# Patient Record
Sex: MALE | Race: BLACK OR AFRICAN AMERICAN | NOT HISPANIC OR LATINO | Employment: STUDENT | RURAL
[De-identification: names, ages, dates, MRNs, and addresses within clinical notes are randomized per-mention and may not be internally consistent; named-entity substitution may affect disease eponyms.]

---
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
## 2023-08-07 ENCOUNTER — HOSPITAL ENCOUNTER (EMERGENCY)
Facility: HOSPITAL | Age: 23
Discharge: HOME | End: 2023-08-08
Attending: FAMILY MEDICINE
Payer: COMMERCIAL

## 2023-08-07 VITALS
WEIGHT: 175 LBS | HEART RATE: 79 BPM | SYSTOLIC BLOOD PRESSURE: 110 MMHG | RESPIRATION RATE: 18 BRPM | BODY MASS INDEX: 25.92 KG/M2 | HEIGHT: 69 IN | OXYGEN SATURATION: 99 % | TEMPERATURE: 98 F | DIASTOLIC BLOOD PRESSURE: 64 MMHG

## 2023-08-07 DIAGNOSIS — K52.9 GASTROENTERITIS: Primary | ICD-10-CM

## 2023-08-07 LAB
ALBUMIN SERPL BCP-MCNC: 3.7 G/DL (ref 3.5–5)
ALP SERPL-CCNC: 53 U/L (ref 45–115)
ALT SERPL W P-5'-P-CCNC: 19 U/L (ref 16–61)
ANION GAP SERPL CALCULATED.3IONS-SCNC: 10 MMOL/L (ref 7–16)
AST SERPL W P-5'-P-CCNC: 17 U/L (ref 15–37)
BASOPHILS # BLD AUTO: 0.01 K/UL (ref 0–0.2)
BASOPHILS NFR BLD AUTO: 0.1 % (ref 0–1)
BILIRUB DIRECT SERPL-MCNC: 0.2 MG/DL (ref 0–0.2)
BILIRUB SERPL-MCNC: 0.6 MG/DL (ref ?–1.2)
BILIRUB UR QL STRIP: NEGATIVE
BUN SERPL-MCNC: 18 MG/DL (ref 7–18)
BUN/CREAT SERPL: 19 (ref 6–20)
CALCIUM SERPL-MCNC: 8.9 MG/DL (ref 8.5–10.1)
CHLORIDE SERPL-SCNC: 109 MMOL/L (ref 98–107)
CLARITY UR: CLEAR
CO2 SERPL-SCNC: 25 MMOL/L (ref 21–32)
COLOR UR: ABNORMAL
CREAT SERPL-MCNC: 0.96 MG/DL (ref 0.7–1.3)
DIFFERENTIAL METHOD BLD: ABNORMAL
EGFR (NO RACE VARIABLE) (RUSH/TITUS): 116 ML/MIN/1.73M2
EOSINOPHIL # BLD AUTO: 0 K/UL (ref 0–0.5)
EOSINOPHIL NFR BLD AUTO: 0 % (ref 1–4)
ERYTHROCYTE [DISTWIDTH] IN BLOOD BY AUTOMATED COUNT: 12.5 % (ref 11.5–14.5)
GLUCOSE SERPL-MCNC: 83 MG/DL (ref 74–106)
GLUCOSE UR STRIP-MCNC: NORMAL MG/DL
HCT VFR BLD AUTO: 45.8 % (ref 40–54)
HGB BLD-MCNC: 15 G/DL (ref 13.5–18)
IMM GRANULOCYTES # BLD AUTO: 0.15 K/UL (ref 0–0.04)
IMM GRANULOCYTES NFR BLD: 1.4 % (ref 0–0.4)
KETONES UR STRIP-SCNC: 40 MG/DL
LEUKOCYTE ESTERASE UR QL STRIP: NEGATIVE
LIPASE SERPL-CCNC: <19 U/L (ref 73–393)
LYMPHOCYTES # BLD AUTO: 0.36 K/UL (ref 1–4.8)
LYMPHOCYTES NFR BLD AUTO: 3.3 % (ref 27–41)
MCH RBC QN AUTO: 27.9 PG (ref 27–31)
MCHC RBC AUTO-ENTMCNC: 32.8 G/DL (ref 32–36)
MCV RBC AUTO: 85.1 FL (ref 80–96)
MONOCYTES # BLD AUTO: 0.64 K/UL (ref 0–0.8)
MONOCYTES NFR BLD AUTO: 5.9 % (ref 2–6)
MPC BLD CALC-MCNC: 10.8 FL (ref 9.4–12.4)
NEUTROPHILS # BLD AUTO: 9.64 K/UL (ref 1.8–7.7)
NEUTROPHILS NFR BLD AUTO: 89.3 % (ref 53–65)
NITRITE UR QL STRIP: NEGATIVE
NRBC # BLD AUTO: 0 X10E3/UL
NRBC, AUTO (.00): 0 %
PH UR STRIP: 5.5 PH UNITS
PLATELET # BLD AUTO: 226 K/UL (ref 150–400)
POTASSIUM SERPL-SCNC: 3.8 MMOL/L (ref 3.5–5.1)
PROT SERPL-MCNC: 6.6 G/DL (ref 6.4–8.2)
PROT UR QL STRIP: 20
RBC # BLD AUTO: 5.38 M/UL (ref 4.6–6.2)
RBC # UR STRIP: NEGATIVE /UL
SODIUM SERPL-SCNC: 140 MMOL/L (ref 136–145)
SP GR UR STRIP: 1.03
UROBILINOGEN UR STRIP-ACNC: NORMAL MG/DL
WBC # BLD AUTO: 10.8 K/UL (ref 4.5–11)

## 2023-08-07 PROCEDURE — 96376 TX/PRO/DX INJ SAME DRUG ADON: CPT

## 2023-08-07 PROCEDURE — 96374 THER/PROPH/DIAG INJ IV PUSH: CPT

## 2023-08-07 PROCEDURE — 99284 PR EMERGENCY DEPT VISIT,LEVEL IV: ICD-10-PCS | Mod: ,,, | Performed by: FAMILY MEDICINE

## 2023-08-07 PROCEDURE — 99284 EMERGENCY DEPT VISIT MOD MDM: CPT | Mod: ,,, | Performed by: FAMILY MEDICINE

## 2023-08-07 PROCEDURE — 81003 URINALYSIS AUTO W/O SCOPE: CPT | Performed by: NURSE PRACTITIONER

## 2023-08-07 PROCEDURE — 99284 EMERGENCY DEPT VISIT MOD MDM: CPT | Mod: 25

## 2023-08-07 PROCEDURE — 83690 ASSAY OF LIPASE: CPT | Performed by: NURSE PRACTITIONER

## 2023-08-07 PROCEDURE — 96361 HYDRATE IV INFUSION ADD-ON: CPT

## 2023-08-07 PROCEDURE — 85025 COMPLETE CBC W/AUTO DIFF WBC: CPT | Performed by: NURSE PRACTITIONER

## 2023-08-07 PROCEDURE — 25000003 PHARM REV CODE 250: Performed by: NURSE PRACTITIONER

## 2023-08-07 PROCEDURE — 80307 DRUG TEST PRSMV CHEM ANLYZR: CPT | Performed by: NURSE PRACTITIONER

## 2023-08-07 PROCEDURE — 80048 BASIC METABOLIC PNL TOTAL CA: CPT | Performed by: NURSE PRACTITIONER

## 2023-08-07 PROCEDURE — 96375 TX/PRO/DX INJ NEW DRUG ADDON: CPT

## 2023-08-07 PROCEDURE — 63600175 PHARM REV CODE 636 W HCPCS: Performed by: NURSE PRACTITIONER

## 2023-08-07 PROCEDURE — 80076 HEPATIC FUNCTION PANEL: CPT | Performed by: NURSE PRACTITIONER

## 2023-08-07 RX ORDER — ONDANSETRON 2 MG/ML
4 INJECTION INTRAMUSCULAR; INTRAVENOUS
Status: COMPLETED | OUTPATIENT
Start: 2023-08-07 | End: 2023-08-07

## 2023-08-07 RX ORDER — SODIUM CHLORIDE 9 MG/ML
1000 INJECTION, SOLUTION INTRAVENOUS
Status: COMPLETED | OUTPATIENT
Start: 2023-08-07 | End: 2023-08-07

## 2023-08-07 RX ORDER — MORPHINE SULFATE 4 MG/ML
4 INJECTION, SOLUTION INTRAMUSCULAR; INTRAVENOUS
Status: COMPLETED | OUTPATIENT
Start: 2023-08-07 | End: 2023-08-07

## 2023-08-07 RX ADMIN — SODIUM CHLORIDE 1000 ML: 9 INJECTION, SOLUTION INTRAVENOUS at 07:08

## 2023-08-07 RX ADMIN — ONDANSETRON HYDROCHLORIDE 4 MG: 2 SOLUTION INTRAMUSCULAR; INTRAVENOUS at 07:08

## 2023-08-07 RX ADMIN — ONDANSETRON HYDROCHLORIDE 4 MG: 2 SOLUTION INTRAMUSCULAR; INTRAVENOUS at 10:08

## 2023-08-07 RX ADMIN — MORPHINE SULFATE 4 MG: 4 INJECTION, SOLUTION INTRAMUSCULAR; INTRAVENOUS at 10:08

## 2023-08-07 NOTE — ED PROVIDER NOTES
Encounter Date: 8/7/2023       History     Chief Complaint   Patient presents with    Abdominal Pain    Nausea    Emesis     23 y/o AAM presents to the emergency department with c/o abdominal pain, nausea and vomiting that began this morning. He reports he has been unable to keep anything down today, although he has not attempted to eat much due to being nauseated. He reports he has vomited about 4 times. He states he also had an episode of diarrhea. He reports having normal BM on yesterday. He denies melena or hematochezia. He denies hematemesis. He reports having chills, denies fever. He denies dysuria or hematuria. He has had nothing for his symptoms. He knows of no exacerbating or remitting factors. He reports smoking daily; he denies alcohol or illicit drug use.     The history is provided by the patient and a friend.     Review of patient's allergies indicates:   Allergen Reactions    Amoxicillin      History reviewed. No pertinent past medical history.  History reviewed. No pertinent surgical history.  Family History   Problem Relation Age of Onset    Melanoma Neg Hx      Social History     Tobacco Use    Smoking status: Never    Smokeless tobacco: Never   Substance Use Topics    Alcohol use: Yes    Drug use: Yes     Types: Marijuana     Review of Systems   All other systems reviewed and are negative.      Physical Exam     Initial Vitals [08/07/23 1703]   BP Pulse Resp Temp SpO2   106/68 71 20 97.9 °F (36.6 °C) 98 %      MAP       --         Physical Exam    Constitutional: He appears well-developed and well-nourished. He is active and cooperative.   Cardiovascular:  Normal rate, regular rhythm, normal heart sounds and normal pulses.           Pulmonary/Chest: Effort normal and breath sounds normal.   Abdominal: Abdomen is soft. Bowel sounds are normal. There is abdominal tenderness in the left upper quadrant.     Neurological: He is alert and oriented to person, place, and time.   Skin: Skin is warm, dry  and intact. Capillary refill takes less than 2 seconds.   Psychiatric: He has a normal mood and affect. His speech is normal and behavior is normal. Judgment and thought content normal. Cognition and memory are normal.         Medical Screening Exam   See Full Note    ED Course   Procedures  Labs Reviewed   URINALYSIS, REFLEX TO URINE CULTURE - Abnormal; Notable for the following components:       Result Value    Protein, UA 20 (*)     Ketones, UA 40 (*)     Specific Gravity, UA 1.033 (*)     All other components within normal limits   BASIC METABOLIC PANEL - Abnormal; Notable for the following components:    Chloride 109 (*)     All other components within normal limits   LIPASE - Abnormal; Notable for the following components:    Lipase <19 (*)     All other components within normal limits   CBC WITH DIFFERENTIAL - Abnormal; Notable for the following components:    Neutrophils % 89.3 (*)     Lymphocytes % 3.3 (*)     Eosinophils % 0.0 (*)     Immature Granulocytes % 1.4 (*)     Neutrophils, Abs 9.64 (*)     Lymphocytes, Absolute 0.36 (*)     Immature Granulocytes, Absolute 0.15 (*)     All other components within normal limits   HEPATIC FUNCTION PANEL - Normal   CBC W/ AUTO DIFFERENTIAL    Narrative:     The following orders were created for panel order CBC auto differential.  Procedure                               Abnormality         Status                     ---------                               -----------         ------                     CBC with Differential[[id number]]        Abnormal            Final result                 Please view results for these tests on the individual orders.   DRUG SCREEN, URINE (BEAKER)   EXTRA TUBES    Narrative:     The following orders were created for panel order EXTRA TUBES.  Procedure                               Abnormality         Status                     ---------                               -----------         ------                     Gold Top Hold[[id number]]                                     In process                   Please view results for these tests on the individual orders.   GOLD TOP HOLD   EXTRA TUBES    Narrative:     The following orders were created for panel order EXTRA TUBES.  Procedure                               Abnormality         Status                     ---------                               -----------         ------                     Gold Top Hold[[id number]]                                    In process                   Please view results for these tests on the individual orders.   GOLD TOP HOLD          Imaging Results              X-Ray KUB (Final result)  Result time 08/07/23 18:12:19      Final result by Chano Hopkins MD (08/07/23 18:12:19)                   Impression:      No acute process      Electronically signed by: Chano Hopkins  Date:    08/07/2023  Time:    18:12               Narrative:    EXAMINATION:  XR KUB    CLINICAL HISTORY:  abd pain, nausea, vomiting;.    COMPARISON:  No previous    TECHNIQUE:  AP supine abdomen    FINDINGS:  The bowel gas pattern is nonobstructive without gross mass lesion.  There is no radiopaque calculus.  There is no acute osseous abnormality.                                       Medications   0.9%  NaCl infusion (0 mLs Intravenous Stopped 8/7/23 2036)   ondansetron injection 4 mg (4 mg Intravenous Given 8/7/23 1936)   morphine injection 4 mg (4 mg Intravenous Given 8/7/23 2204)   ondansetron injection 4 mg (4 mg Intravenous Given 8/7/23 2204)     Medical Decision Making:   Initial Assessment:   21 y/o AAM presents to the emergency department with c/o abdominal pain, nausea and vomiting that began this morning. He reports he has been unable to keep anything down today, although he has not attempted to eat much due to being nauseated. He reports he has vomited about 4 times. He states he also had an episode of diarrhea. He reports having normal BM on yesterday. He denies melena or  hematochezia. He denies hematemesis. He reports having chills, denies fever. He denies dysuria or hematuria. He has had nothing for his symptoms. He knows of no exacerbating or remitting factors. He reports smoking daily; he denies alcohol or illicit drug use.   Differential Diagnosis:   Gastroenteritis  Pancreatitis  Constipation  Vs other  Clinical Tests:   Lab Tests: Ordered  Radiological Study: Ordered  2150 called lab to check on status of results, was told that the labs were rejected and needed to be redrawn.  2350 awaiting CT    Care transferred to Dr. Francis.                         Clinical Impression:   Final diagnoses:  [K52.9] Gastroenteritis (Primary)        ED Disposition Condition    Discharge Stable          ED Prescriptions       Medication Sig Dispense Start Date End Date Auth. Provider    ondansetron (ZOFRAN) 4 MG tablet Take 1 tablet (4 mg total) by mouth every 6 (six) hours. 12 tablet 8/8/2023 -- Aman Francis, DO          Follow-up Information    None          Aman Francis,   08/08/23 0057

## 2023-08-07 NOTE — Clinical Note
"Deangelo "Spring Thurston was seen and treated in our emergency department on 8/7/2023.  He may return to work on 08/11/2023.       If you have any questions or concerns, please don't hesitate to call.      Aman Francis, DO"

## 2023-08-08 ENCOUNTER — TELEPHONE (OUTPATIENT)
Dept: EMERGENCY MEDICINE | Facility: HOSPITAL | Age: 23
End: 2023-08-08
Payer: COMMERCIAL

## 2023-08-08 LAB
AMPHET UR QL SCN: NEGATIVE
BARBITURATES UR QL SCN: NEGATIVE
BENZODIAZ METAB UR QL SCN: NEGATIVE
CANNABINOIDS UR QL SCN: POSITIVE
COCAINE UR QL SCN: NEGATIVE
OPIATES UR QL SCN: NEGATIVE
PCP UR QL SCN: NEGATIVE

## 2023-08-08 RX ORDER — ONDANSETRON 4 MG/1
4 TABLET, FILM COATED ORAL EVERY 6 HOURS
Qty: 12 TABLET | Refills: 0 | Status: SHIPPED | OUTPATIENT
Start: 2023-08-08

## 2024-12-10 ENCOUNTER — OFFICE VISIT (OUTPATIENT)
Dept: FAMILY MEDICINE | Facility: CLINIC | Age: 24
End: 2024-12-10
Payer: COMMERCIAL

## 2024-12-10 VITALS
HEART RATE: 88 BPM | WEIGHT: 192.19 LBS | DIASTOLIC BLOOD PRESSURE: 78 MMHG | OXYGEN SATURATION: 98 % | RESPIRATION RATE: 18 BRPM | HEIGHT: 69 IN | TEMPERATURE: 98 F | BODY MASS INDEX: 28.47 KG/M2 | SYSTOLIC BLOOD PRESSURE: 135 MMHG

## 2024-12-10 DIAGNOSIS — R30.0 DYSURIA: ICD-10-CM

## 2024-12-10 DIAGNOSIS — Z00.00 WELLNESS EXAMINATION: Primary | ICD-10-CM

## 2024-12-10 DIAGNOSIS — Z13.9 SCREENING DUE: ICD-10-CM

## 2024-12-10 DIAGNOSIS — Z28.21 NO VACCINATION-PT REFUSE: ICD-10-CM

## 2024-12-10 DIAGNOSIS — A54.9 GONORRHEA: ICD-10-CM

## 2024-12-10 LAB
ALBUMIN SERPL BCP-MCNC: 4.3 G/DL (ref 3.5–5)
ALBUMIN/GLOB SERPL: 1.6 {RATIO}
ALP SERPL-CCNC: 60 U/L (ref 40–150)
ALT SERPL W P-5'-P-CCNC: 11 U/L
ANION GAP SERPL CALCULATED.3IONS-SCNC: 11 MMOL/L (ref 7–16)
AST SERPL W P-5'-P-CCNC: 23 U/L (ref 5–34)
BACTERIA #/AREA URNS HPF: ABNORMAL /HPF
BASOPHILS # BLD AUTO: 0.06 K/UL (ref 0–0.2)
BASOPHILS NFR BLD AUTO: 1.3 % (ref 0–1)
BILIRUB SERPL-MCNC: 0.3 MG/DL
BILIRUB UR QL STRIP: NEGATIVE
BUN SERPL-MCNC: 16 MG/DL (ref 9–21)
BUN/CREAT SERPL: 15 (ref 6–20)
CALCIUM SERPL-MCNC: 9.3 MG/DL (ref 8.4–10.2)
CHLAMYDIA BY PCR: NEGATIVE
CHLORIDE SERPL-SCNC: 105 MMOL/L (ref 98–107)
CHOLEST SERPL-MCNC: 143 MG/DL
CHOLEST/HDLC SERPL: 3 {RATIO}
CLARITY UR: ABNORMAL
CO2 SERPL-SCNC: 24 MMOL/L (ref 22–29)
COLOR UR: ABNORMAL
CREAT SERPL-MCNC: 1.05 MG/DL (ref 0.72–1.25)
DIFFERENTIAL METHOD BLD: ABNORMAL
EGFR (NO RACE VARIABLE) (RUSH/TITUS): 103 ML/MIN/1.73M2
EOSINOPHIL # BLD AUTO: 0.06 K/UL (ref 0–0.5)
EOSINOPHIL NFR BLD AUTO: 1.3 % (ref 1–4)
ERYTHROCYTE [DISTWIDTH] IN BLOOD BY AUTOMATED COUNT: 12.6 % (ref 11.5–14.5)
EST. AVERAGE GLUCOSE BLD GHB EST-MCNC: 103 MG/DL
GLOBULIN SER-MCNC: 2.7 G/DL (ref 2–4)
GLUCOSE SERPL-MCNC: 128 MG/DL (ref 70–110)
GLUCOSE SERPL-MCNC: 83 MG/DL (ref 74–100)
GLUCOSE UR STRIP-MCNC: NORMAL MG/DL
HBA1C MFR BLD HPLC: 5.2 %
HCT VFR BLD AUTO: 45.4 % (ref 40–54)
HCV AB SER QL: NORMAL
HDLC SERPL-MCNC: 48 MG/DL (ref 35–60)
HGB BLD-MCNC: 14.3 G/DL (ref 13.5–18)
HIV 1+O+2 AB SERPL QL: NORMAL
IMM GRANULOCYTES # BLD AUTO: 0.01 K/UL (ref 0–0.04)
IMM GRANULOCYTES NFR BLD: 0.2 % (ref 0–0.4)
KETONES UR STRIP-SCNC: NEGATIVE MG/DL
LDLC SERPL CALC-MCNC: 88 MG/DL
LDLC/HDLC SERPL: 1.8 {RATIO}
LEUKOCYTE ESTERASE UR QL STRIP: ABNORMAL
LYMPHOCYTES # BLD AUTO: 1.38 K/UL (ref 1–4.8)
LYMPHOCYTES NFR BLD AUTO: 30.8 % (ref 27–41)
MCH RBC QN AUTO: 26.8 PG (ref 27–31)
MCHC RBC AUTO-ENTMCNC: 31.5 G/DL (ref 32–36)
MCV RBC AUTO: 85.2 FL (ref 80–96)
MONOCYTES # BLD AUTO: 0.47 K/UL (ref 0–0.8)
MONOCYTES NFR BLD AUTO: 10.5 % (ref 2–6)
MPC BLD CALC-MCNC: 11 FL (ref 9.4–12.4)
N. GONORRHOEAE (GC) BY PCR: POSITIVE
NEUTROPHILS # BLD AUTO: 2.5 K/UL (ref 1.8–7.7)
NEUTROPHILS NFR BLD AUTO: 55.9 % (ref 53–65)
NITRITE UR QL STRIP: NEGATIVE
NONHDLC SERPL-MCNC: 95 MG/DL
NRBC # BLD AUTO: 0 X10E3/UL
NRBC, AUTO (.00): 0 %
PH UR STRIP: 7.5 PH UNITS
PLATELET # BLD AUTO: 267 K/UL (ref 150–400)
POTASSIUM SERPL-SCNC: 4.2 MMOL/L (ref 3.5–5.1)
PROT SERPL-MCNC: 7 G/DL (ref 6.4–8.3)
PROT UR QL STRIP: 10
RBC # BLD AUTO: 5.33 M/UL (ref 4.6–6.2)
RBC # UR STRIP: ABNORMAL /UL
RBC #/AREA URNS HPF: 9 /HPF
SODIUM SERPL-SCNC: 136 MMOL/L (ref 136–145)
SP GR UR STRIP: 1.02
SYPHILIS AB INTERPRETATION: NORMAL
TRIGL SERPL-MCNC: 34 MG/DL (ref 34–140)
UROBILINOGEN UR STRIP-ACNC: NORMAL MG/DL
VLDLC SERPL-MCNC: 7 MG/DL
WBC # BLD AUTO: 4.48 K/UL (ref 4.5–11)
WBC #/AREA URNS HPF: >182 /HPF

## 2024-12-10 PROCEDURE — 87086 URINE CULTURE/COLONY COUNT: CPT | Mod: ,,, | Performed by: CLINICAL MEDICAL LABORATORY

## 2024-12-10 PROCEDURE — 83036 HEMOGLOBIN GLYCOSYLATED A1C: CPT | Mod: ,,, | Performed by: CLINICAL MEDICAL LABORATORY

## 2024-12-10 PROCEDURE — 85025 COMPLETE CBC W/AUTO DIFF WBC: CPT | Mod: ,,, | Performed by: CLINICAL MEDICAL LABORATORY

## 2024-12-10 PROCEDURE — 80053 COMPREHEN METABOLIC PANEL: CPT | Mod: ,,, | Performed by: CLINICAL MEDICAL LABORATORY

## 2024-12-10 PROCEDURE — 87491 CHLMYD TRACH DNA AMP PROBE: CPT | Mod: ,,, | Performed by: CLINICAL MEDICAL LABORATORY

## 2024-12-10 PROCEDURE — 87591 N.GONORRHOEAE DNA AMP PROB: CPT | Mod: ,,, | Performed by: CLINICAL MEDICAL LABORATORY

## 2024-12-10 PROCEDURE — 87077 CULTURE AEROBIC IDENTIFY: CPT | Mod: ,,, | Performed by: CLINICAL MEDICAL LABORATORY

## 2024-12-10 PROCEDURE — 86780 TREPONEMA PALLIDUM: CPT | Mod: ,,, | Performed by: CLINICAL MEDICAL LABORATORY

## 2024-12-10 PROCEDURE — 86803 HEPATITIS C AB TEST: CPT | Mod: ,,, | Performed by: CLINICAL MEDICAL LABORATORY

## 2024-12-10 PROCEDURE — 87185 SC STD ENZYME DETCJ PER NZM: CPT | Mod: ,,, | Performed by: CLINICAL MEDICAL LABORATORY

## 2024-12-10 PROCEDURE — 81001 URINALYSIS AUTO W/SCOPE: CPT | Mod: ,,, | Performed by: CLINICAL MEDICAL LABORATORY

## 2024-12-10 PROCEDURE — 87389 HIV-1 AG W/HIV-1&-2 AB AG IA: CPT | Mod: ,,, | Performed by: CLINICAL MEDICAL LABORATORY

## 2024-12-10 PROCEDURE — 80061 LIPID PANEL: CPT | Mod: ,,, | Performed by: CLINICAL MEDICAL LABORATORY

## 2024-12-11 ENCOUNTER — CLINICAL SUPPORT (OUTPATIENT)
Dept: FAMILY MEDICINE | Facility: CLINIC | Age: 24
End: 2024-12-11
Payer: COMMERCIAL

## 2024-12-11 VITALS
HEIGHT: 69 IN | RESPIRATION RATE: 19 BRPM | DIASTOLIC BLOOD PRESSURE: 73 MMHG | OXYGEN SATURATION: 98 % | SYSTOLIC BLOOD PRESSURE: 124 MMHG | WEIGHT: 192 LBS | HEART RATE: 77 BPM | BODY MASS INDEX: 28.44 KG/M2 | TEMPERATURE: 98 F

## 2024-12-11 DIAGNOSIS — A54.9 GONORRHEA: Primary | ICD-10-CM

## 2024-12-11 PROBLEM — Z28.21 NO VACCINATION-PT REFUSE: Status: ACTIVE | Noted: 2024-12-11

## 2024-12-11 PROBLEM — R30.0 DYSURIA: Status: ACTIVE | Noted: 2024-12-11

## 2024-12-11 PROBLEM — Z00.00 WELLNESS EXAMINATION: Status: ACTIVE | Noted: 2024-12-11

## 2024-12-11 PROCEDURE — 96372 THER/PROPH/DIAG INJ SC/IM: CPT | Mod: ,,, | Performed by: FAMILY MEDICINE

## 2024-12-11 RX ORDER — CEFTRIAXONE 500 MG/1
500 INJECTION, POWDER, FOR SOLUTION INTRAMUSCULAR; INTRAVENOUS
Status: COMPLETED | OUTPATIENT
Start: 2024-12-11 | End: 2024-12-11

## 2024-12-11 RX ADMIN — CEFTRIAXONE 500 MG: 500 INJECTION, POWDER, FOR SOLUTION INTRAMUSCULAR; INTRAVENOUS at 01:12

## 2024-12-11 NOTE — ASSESSMENT & PLAN NOTE
"Monitor  dysuria. 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 He reports having dysuria for 1 week described as burning while he urinates. He is sexually active but denies new or multiple or male partners. Denies purulent or bloody discharge. Denies abdominal pain or genital pain otherwise. Denies penile rash or any new rashes. Denies f/c/cp/sob/n/v/d. Denies appetite, urinary, or bowel habit changes. He reports being allergic to amoxicillin and received it as a child many years ago. He was told by his parents that he developed "breakout" rash at the time but denies SOB or throat closing or anaphylactic reaction.     Evaluate  Genital exam deferred since pt had no reported rashes. Unremarkable exam - see PE. VSS  Syphilis, chlamydia/gonorrhea ordered, all negative except positive gonorrhea  UA positive for UTI, but reflex urine culture no growth to date.     Assess  Dysuria 2/2 gonorrhea    Treat  Rocephin 500mg IM once. Watch in clinic 30min since pt allergic to amoxicillin (rash breakout many years ago as a child). According to CDC and uptodate, cross-reactivity risk very low with rocephin. Epipen prn.  Positive UA results likely 2/2 gonorrhea. Treating gonorrhea  F/u as needed  "

## 2024-12-11 NOTE — PROGRESS NOTES
"   Patient presented to the clinic for a Ceftriaxone (Rocephin) 500 mg injection. Ordered per Dr. BRANDEN Hairston DO, approved by attending Dr. SOLANGE Peterson DO. Patient has a History of being allergic to Amoxicillin, that gave him a rash as a child.     Hi all, so this patient tested positive for gonorrhea. I re-read CDC and uptodate guidelines, amoxicillin has low cross-reactivity with rocephin, and rocephin is still the primary drug of choice for gonorrhea. He only developed a rash many years ago as a child so the risk for anaphylaxis or adverse reactions is small. I ordered Rocephin 500mg IM for him today, and he should stop by around 1pm to get it. Can you (or RN rooming him) watch him for 30min after he received the rocephin? and have an epi pen ready just in case per Dr. Peterson. If he does okay after 30min getting the rocephin he can go. Thank you and let me know if you have any questions.    Above is the prescribing Doctors secure chat message sent at 12:37 pm 12/11/2024. The message was shared with Renu Mueller MA, Onur Perez LPN (myself) and Dr. Yovanyn LINDSEY.     Patient was pulled back and admitted in  7 at 1:24 pm explained to the patient that since he has a known allergy to Amoxicillin, that he would need to be monitored closely for any signs of adverse reactions to the medication. An epinephrine pen was available in room placed on the counter for easy access. Ceftriaxone (Rocephin) 500 mg injection was administered IM at 1:30 pm to the Left Dorsalgluteal. Patient reported no discomfort or pain after injection. Vital signs taken. T: 98.2 P: 92 R: 18 O2: 97% BP:121/75 (L) arm. Rates his pain as 0/10. Patient monitored under watch of nurse Onur Perez LPN, no signs of anaphylaxis, breathing equal and regular. Will continue to monitor.     1:45 pm second round of vital signs obtained. T: 98.1 P: 82 R: 18 O2: 98%  BP: 121/73 Pain: 0/10 asked the patient was he experiencing any discomfort  or pain. Reported "I'm " "ok" and went back to watching a video on his phone, will continue to monitor.     2:00 pm last round of vital signs obtained. T: 98.2 P:77 R:19 O2: 98% BP: 124/73 Pain: 0/10. Patient reports no pain or discomfort, explained to him that this would be the last round of vital signs before being let go and asked if he had any further questions, patient reported "No ma'am". Further explained to the patient that if he was to experience any onset of allergic reaction such as rash, etc. To notify the clinic for any assistance, or if he had any questions about his treatment today to give us a call. Observed the patient depart the building at 2:02 pm with no signs of illness or distress, entered his car and left the premises.       "

## 2024-12-11 NOTE — ASSESSMENT & PLAN NOTE
"See dysuria  Same workup pasted below    Monitor  dysuria. 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 He reports having dysuria for 1 week described as burning while he urinates. He is sexually active but denies new or multiple or male partners. Denies purulent or bloody discharge. Denies abdominal pain or genital pain otherwise. Denies penile rash or any new rashes. Denies f/c/cp/sob/n/v/d. Denies appetite, urinary, or bowel habit changes. He reports being allergic to amoxicillin and received it as a child many years ago. He was told by his parents that he developed "breakout" rash at the time but denies SOB or throat closing or anaphylactic reaction.     Evaluate  Genital exam deferred since pt had no reported rashes. Unremarkable exam - see PE. VSS  Syphilis, chlamydia/gonorrhea ordered, all negative except positive gonorrhea  UA positive for UTI, but reflex urine culture no growth to date.   Assess  Dysuria 2/2 gonorrhea    Treat  Rocephin 500mg IM once. Watch in clinic 30min since pt allergic to amoxicillin (rash breakout many years ago as a child). According to CDC and uptodate, cross-reactivity risk very low with rocephin. Epipen prn.  Positive UA results likely 2/2 gonorrhea. Treating gonorrhea  F/u as needed  "

## 2024-12-11 NOTE — ASSESSMENT & PLAN NOTE
Monitor  wellness exam and dysuria. 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 He is sexually active but denies new or multiple or male partners. Denies purulent or bloody discharge. Denies abdominal pain or genital pain otherwise. Denies penile rash or any new rashes. Denies f/c/cp/sob/n/v/d. Denies appetite, urinary, or bowel habit changes.     Evaluate  unremarkable exam - see PE. VSS.      Assess  Wellness examination for work    Treat  CBC, CMP, A1c, lipid panel, HIV, Hep C ordered  CBC, CMP, A1c, lipid panel all unremarkable, HIV, Hep C negative. Results discussed with pt over the phone on 12/11/24  F/u as needed

## 2024-12-11 NOTE — PROGRESS NOTES
Called pt on phone and discussed results below on 12/11/24 :  Syphilis, chlamydia/gonorrhea all negative except positive gonorrhea, UA positive for UTI, but reflex urine culture no growth to date. Positive UTI result likely 2/2 gonorrhea, treating with rocephin 500mg IM  CBC, CMP, A1c, lipid panel all unremarkable, HIV, Hep C negative. All discussed with patient. He came back on 12/11 to clinic as a nurse visit to get rocephin. F/u prn.

## 2024-12-11 NOTE — PROGRESS NOTES
"Subjective:       Patient ID: Deangleo Thurston is a 24 y.o. male.    Chief Complaint: Establish Care (Patient is needing a wellness visit and has a complaint of burning while urinating) and Exposure to STD    23 yo M with a PMH of chlamydia who presents to [facility] clinic with wellness exam and dysuria. 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 He reports having dysuria for 1 week described as burning while he urinates. He is sexually active but denies new or multiple or male partners. Denies purulent or bloody discharge. Denies abdominal pain or genital pain otherwise. Denies penile rash or any new rashes. Denies f/c/cp/sob/n/v/d. Denies appetite, urinary, or bowel habit changes. He reports being allergic to amoxicillin and received it as a child many years ago. He was told by his parents that he developed "breakout" rash at the time but denies SOB or throat closing or anaphylactic reaction.         No current outpatient medications on file.  No current facility-administered medications for this visit.    Review of patient's allergies indicates:   Allergen Reactions    Amoxicillin        History reviewed. No pertinent past medical history.    History reviewed. No pertinent surgical history.    Family History   Problem Relation Name Age of Onset    Melanoma Neg Hx         Social History     Tobacco Use    Smoking status: Never    Smokeless tobacco: Never   Substance Use Topics    Alcohol use: Yes    Drug use: Yes     Types: Marijuana       Review of Systems   Constitutional:  Negative for appetite change, chills and fever.   HENT:  Negative for trouble swallowing.    Eyes:  Negative for visual disturbance.   Respiratory:  Negative for shortness of breath.    Cardiovascular:  Negative for chest pain.   Gastrointestinal:  Negative for abdominal pain, diarrhea, nausea and vomiting.   Genitourinary:  Positive for dysuria. Negative for difficulty " "urinating, discharge, flank pain, genital sores, hematuria, penile pain, penile swelling, scrotal swelling and testicular pain.   Musculoskeletal:  Negative for joint deformity.   Integumentary:  Negative for wound.   Neurological:  Negative for weakness.   Psychiatric/Behavioral:  Negative for sleep disturbance.          Current Medications:   Medication List with Changes/Refills   Discontinued Medications    ONDANSETRON (ZOFRAN) 4 MG TABLET    Take 1 tablet (4 mg total) by mouth every 6 (six) hours.       Start Date: 8/8/2023  End Date: 12/10/2024            Objective:        Vitals:    12/10/24 1015   BP: 135/78   BP Location: Right arm   Patient Position: Sitting   Pulse: 88   Resp: 18   Temp: 98 °F (36.7 °C)   TempSrc: Oral   SpO2: 98%   Weight: 87.2 kg (192 lb 3.2 oz)   Height: 5' 9" (1.753 m)       Physical Exam  Vitals and nursing note reviewed.   Constitutional:       General: He is not in acute distress.     Appearance: Normal appearance. He is not ill-appearing, toxic-appearing or diaphoretic.   HENT:      Head: Normocephalic and atraumatic.      Right Ear: External ear normal.      Left Ear: External ear normal.      Nose: Nose normal. No congestion or rhinorrhea.      Mouth/Throat:      Mouth: Mucous membranes are moist.      Pharynx: Oropharynx is clear. No oropharyngeal exudate or posterior oropharyngeal erythema.   Eyes:      General: No scleral icterus.        Right eye: No discharge.         Left eye: No discharge.      Conjunctiva/sclera: Conjunctivae normal.      Pupils: Pupils are equal, round, and reactive to light.   Cardiovascular:      Rate and Rhythm: Normal rate and regular rhythm.      Pulses: Normal pulses.      Heart sounds: Normal heart sounds. No murmur heard.  Pulmonary:      Effort: Pulmonary effort is normal. No respiratory distress.      Breath sounds: Normal breath sounds. No wheezing, rhonchi or rales.   Abdominal:      General: Bowel sounds are normal. There is no distension. " "     Palpations: Abdomen is soft.      Tenderness: There is no abdominal tenderness.   Musculoskeletal:         General: No swelling or deformity.      Cervical back: Neck supple.      Right lower leg: No edema.      Left lower leg: No edema.   Skin:     General: Skin is warm and dry.      Coloration: Skin is not jaundiced or pale.      Findings: No lesion.   Neurological:      Mental Status: He is alert and oriented to person, place, and time.      Sensory: No sensory deficit.      Gait: Gait normal.   Psychiatric:         Mood and Affect: Mood normal.         Behavior: Behavior normal.               Lab Results   Component Value Date    WBC 4.48 (L) 12/10/2024    HGB 14.3 12/10/2024    HCT 45.4 12/10/2024     12/10/2024    CHOL 143 12/10/2024    TRIG 34 12/10/2024    HDL 48 12/10/2024    ALT 11 12/10/2024    AST 23 12/10/2024     12/10/2024    K 4.2 12/10/2024     12/10/2024    CREATININE 1.05 12/10/2024    BUN 16 12/10/2024    CO2 24 12/10/2024    HGBA1C 5.2 12/10/2024      Assessment:       1. Wellness examination    2. Dysuria    3. Gonorrhea    4. Screening due    5. No vaccination-pt refuse        Plan:         Problem List Items Addressed This Visit          Renal/    Dysuria     Monitor  dysuria. 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 He reports having dysuria for 1 week described as burning while he urinates. He is sexually active but denies new or multiple or male partners. Denies purulent or bloody discharge. Denies abdominal pain or genital pain otherwise. Denies penile rash or any new rashes. Denies f/c/cp/sob/n/v/d. Denies appetite, urinary, or bowel habit changes. He reports being allergic to amoxicillin and received it as a child many years ago. He was told by his parents that he developed "breakout" rash at the time but denies SOB or throat closing or anaphylactic reaction.     Evaluate  Genital exam " "deferred since pt had no reported rashes. Unremarkable exam - see PE. VSS  Syphilis, chlamydia/gonorrhea ordered, all negative except positive gonorrhea  UA positive for UTI, but reflex urine culture no growth to date.     Assess  Dysuria 2/2 gonorrhea    Treat  Rocephin 500mg IM once. Watch in clinic 30min since pt allergic to amoxicillin (rash breakout many years ago as a child). According to CDC and uptodate, cross-reactivity risk very low with rocephin. Epipen prn.  Positive UA results likely 2/2 gonorrhea. Treating gonorrhea  F/u as needed         Relevant Medications    cefTRIAXone injection 500 mg (Completed)    Other Relevant Orders    Chlamydia/GC, PCR (Completed)    Syphilis Antibody with reflex to RPR (Completed)    Urinalysis, Reflex to Urine Culture (Completed)    Urinalysis, Microscopic (Completed)    Urine culture (Completed)       ID    Gonorrhea     See dysuria  Same workup pasted below    Monitor  dysuria. 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 He reports having dysuria for 1 week described as burning while he urinates. He is sexually active but denies new or multiple or male partners. Denies purulent or bloody discharge. Denies abdominal pain or genital pain otherwise. Denies penile rash or any new rashes. Denies f/c/cp/sob/n/v/d. Denies appetite, urinary, or bowel habit changes. He reports being allergic to amoxicillin and received it as a child many years ago. He was told by his parents that he developed "breakout" rash at the time but denies SOB or throat closing or anaphylactic reaction.     Evaluate  Genital exam deferred since pt had no reported rashes. Unremarkable exam - see PE. VSS  Syphilis, chlamydia/gonorrhea ordered, all negative except positive gonorrhea  UA positive for UTI, but reflex urine culture no growth to date.   Assess  Dysuria 2/2 gonorrhea    Treat  Rocephin 500mg IM once. Watch in clinic 30min since " pt allergic to amoxicillin (rash breakout many years ago as a child). According to CDC and uptodate, cross-reactivity risk very low with rocephin. Epipen prn.  Positive UA results likely 2/2 gonorrhea. Treating gonorrhea  F/u as needed         Relevant Medications    cefTRIAXone injection 500 mg (Completed)    No vaccination-pt refuse     Pt refused all vaccines today, risks/benefits discussed            Other    Wellness examination - Primary     Monitor  wellness exam and dysuria. 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 He is sexually active but denies new or multiple or male partners. Denies purulent or bloody discharge. Denies abdominal pain or genital pain otherwise. Denies penile rash or any new rashes. Denies f/c/cp/sob/n/v/d. Denies appetite, urinary, or bowel habit changes.     Evaluate  unremarkable exam - see PE. VSS.      Assess  Wellness examination for work    Treat  CBC, CMP, A1c, lipid panel, HIV, Hep C ordered  CBC, CMP, A1c, lipid panel all unremarkable, HIV, Hep C negative. Results discussed with pt over the phone on 12/11/24  F/u as needed         Relevant Orders    CBC Auto Differential (Completed)    Comprehensive Metabolic Panel (Completed)    Hemoglobin A1C (Completed)    POCT glucose (Completed)    Lipid Panel (Completed)     Other Visit Diagnoses       Screening due        Relevant Orders    HIV 1/2 Ag/Ab (4th Gen) (Completed)    Hepatitis C Antibody (Completed)              Follow up if symptoms worsen or fail to improve.    Davie Hairston DO     Instructed patient that if symptoms fail to improve or worsen patient should seek immediate medical attention or report to the nearest emergency department. Patient expressed verbal agreement and understanding to this plan of care.

## 2024-12-14 LAB
B-LACTAMASE USUAL SUSC ISLT: NEGATIVE
UA COMPLETE W REFLEX CULTURE PNL UR: ABNORMAL